# Patient Record
Sex: FEMALE | Race: WHITE | NOT HISPANIC OR LATINO
[De-identification: names, ages, dates, MRNs, and addresses within clinical notes are randomized per-mention and may not be internally consistent; named-entity substitution may affect disease eponyms.]

---

## 2019-03-13 PROBLEM — Z00.00 ENCOUNTER FOR PREVENTIVE HEALTH EXAMINATION: Status: ACTIVE | Noted: 2019-03-13

## 2019-03-14 ENCOUNTER — TRANSCRIPTION ENCOUNTER (OUTPATIENT)
Age: 39
End: 2019-03-14

## 2019-03-14 ENCOUNTER — APPOINTMENT (OUTPATIENT)
Dept: FAMILY MEDICINE | Facility: CLINIC | Age: 39
End: 2019-03-14
Payer: COMMERCIAL

## 2019-03-14 VITALS
DIASTOLIC BLOOD PRESSURE: 64 MMHG | HEART RATE: 68 BPM | OXYGEN SATURATION: 98 % | SYSTOLIC BLOOD PRESSURE: 116 MMHG | RESPIRATION RATE: 14 BRPM | BODY MASS INDEX: 23.78 KG/M2 | HEIGHT: 66 IN | WEIGHT: 148 LBS

## 2019-03-14 DIAGNOSIS — R41.3 OTHER AMNESIA: ICD-10-CM

## 2019-03-14 DIAGNOSIS — L65.9 NONSCARRING HAIR LOSS, UNSPECIFIED: ICD-10-CM

## 2019-03-14 DIAGNOSIS — Z82.49 FAMILY HISTORY OF ISCHEMIC HEART DISEASE AND OTHER DISEASES OF THE CIRCULATORY SYSTEM: ICD-10-CM

## 2019-03-14 DIAGNOSIS — Z83.3 FAMILY HISTORY OF DIABETES MELLITUS: ICD-10-CM

## 2019-03-14 DIAGNOSIS — Z78.9 OTHER SPECIFIED HEALTH STATUS: ICD-10-CM

## 2019-03-14 DIAGNOSIS — R53.83 OTHER FATIGUE: ICD-10-CM

## 2019-03-14 DIAGNOSIS — Z87.891 PERSONAL HISTORY OF NICOTINE DEPENDENCE: ICD-10-CM

## 2019-03-14 DIAGNOSIS — M25.50 PAIN IN UNSPECIFIED JOINT: ICD-10-CM

## 2019-03-14 DIAGNOSIS — Z12.31 ENCOUNTER FOR SCREENING MAMMOGRAM FOR MALIGNANT NEOPLASM OF BREAST: ICD-10-CM

## 2019-03-14 DIAGNOSIS — Z82.5 FAMILY HISTORY OF ASTHMA AND OTHER CHRONIC LOWER RESPIRATORY DISEASES: ICD-10-CM

## 2019-03-14 LAB — CYTOLOGY CVX/VAG DOC THIN PREP: NORMAL

## 2019-03-14 PROCEDURE — 36415 COLL VENOUS BLD VENIPUNCTURE: CPT

## 2019-03-14 PROCEDURE — 99213 OFFICE O/P EST LOW 20 MIN: CPT | Mod: 25

## 2019-03-14 NOTE — ASSESSMENT
[FreeTextEntry1] : Check labs drawn in office today for above assessed conditions. Labs to be resulted at the [facility] core lab. \par If nothing found on labs will consider rheumatology referral

## 2019-03-14 NOTE — HISTORY OF PRESENT ILLNESS
[FreeTextEntry8] : Pt c/o +fatigue +body aches +joint pain (hands) +tingling in feet and hands X 2-3 weeks. Patient also reports mild  brain fogginess, where she states she sometimes has difficulty recalling things. Recently started new fitness regimen one month ago. Denies weakness, speech difficulties, vision changes. Had MRI of brain last yr to evAluate migraine and MRI was negative. \par

## 2019-03-14 NOTE — PHYSICAL EXAM
[No Acute Distress] : no acute distress [Well Nourished] : well nourished [Well Developed] : well developed [Well-Appearing] : well-appearing [Normal Sclera/Conjunctiva] : normal sclera/conjunctiva [PERRL] : pupils equal round and reactive to light [Normal Outer Ear/Nose] : the outer ears and nose were normal in appearance [Supple] : supple [No Respiratory Distress] : no respiratory distress  [Clear to Auscultation] : lungs were clear to auscultation bilaterally [No Accessory Muscle Use] : no accessory muscle use [Normal Rate] : normal rate  [Regular Rhythm] : with a regular rhythm [Normal S1, S2] : normal S1 and S2 [No Murmur] : no murmur heard [Normal Gait] : normal gait [Coordination Grossly Intact] : coordination grossly intact [No Focal Deficits] : no focal deficits [Normal] : coordination was normal [Speech Grossly Normal] : speech grossly normal [Memory Grossly Normal] : memory grossly normal [Normal Affect] : the affect was normal [Alert and Oriented x3] : oriented to person, place, and time [Normal Mood] : the mood was normal [Normal Insight/Judgement] : insight and judgment were intact [de-identified] : C

## 2019-03-18 LAB
25(OH)D3 SERPL-MCNC: 35 NG/ML
ALBUMIN SERPL ELPH-MCNC: 5.1 G/DL
ALP BLD-CCNC: 62 U/L
ALT SERPL-CCNC: 22 U/L
ANA SER IF-ACNC: NEGATIVE
ANION GAP SERPL CALC-SCNC: 11 MMOL/L
AST SERPL-CCNC: 22 U/L
BASOPHILS # BLD AUTO: 0.05 K/UL
BASOPHILS NFR BLD AUTO: 0.5 %
BILIRUB SERPL-MCNC: 0.3 MG/DL
BUN SERPL-MCNC: 15 MG/DL
CALCIUM SERPL-MCNC: 10 MG/DL
CHLORIDE SERPL-SCNC: 104 MMOL/L
CO2 SERPL-SCNC: 24 MMOL/L
CREAT SERPL-MCNC: 0.73 MG/DL
CRP SERPL-MCNC: <0.1 MG/DL
EOSINOPHIL # BLD AUTO: 0.04 K/UL
EOSINOPHIL NFR BLD AUTO: 0.4 %
ERYTHROCYTE [SEDIMENTATION RATE] IN BLOOD BY WESTERGREN METHOD: 15 MM/HR
FERRITIN SERPL-MCNC: 32 NG/ML
GLUCOSE SERPL-MCNC: 98 MG/DL
HCT VFR BLD CALC: 40.1 %
HGB BLD-MCNC: 12.8 G/DL
IMM GRANULOCYTES NFR BLD AUTO: 0.2 %
IRON SATN MFR SERPL: 40 %
IRON SERPL-MCNC: 128 UG/DL
LYMPHOCYTES # BLD AUTO: 1.29 K/UL
LYMPHOCYTES NFR BLD AUTO: 13.5 %
MAN DIFF?: NORMAL
MCHC RBC-ENTMCNC: 30.8 PG
MCHC RBC-ENTMCNC: 31.9 GM/DL
MCV RBC AUTO: 96.6 FL
MONOCYTES # BLD AUTO: 0.63 K/UL
MONOCYTES NFR BLD AUTO: 6.6 %
NEUTROPHILS # BLD AUTO: 7.51 K/UL
NEUTROPHILS NFR BLD AUTO: 78.8 %
PLATELET # BLD AUTO: 265 K/UL
POTASSIUM SERPL-SCNC: 4.1 MMOL/L
PROT SERPL-MCNC: 7.2 G/DL
RBC # BLD: 4.15 M/UL
RBC # FLD: 12.7 %
RHEUMATOID FACT SER QL: <10 IU/ML
SODIUM SERPL-SCNC: 139 MMOL/L
T4 SERPL-MCNC: 6.3 UG/DL
TIBC SERPL-MCNC: 320 UG/DL
TSH SERPL-ACNC: 1.14 UIU/ML
UIBC SERPL-MCNC: 192 UG/DL
URATE SERPL-MCNC: 3.4 MG/DL
VIT B12 SERPL-MCNC: 608 PG/ML
WBC # FLD AUTO: 9.54 K/UL

## 2019-03-21 LAB

## 2019-04-04 ENCOUNTER — CLINICAL ADVICE (OUTPATIENT)
Age: 39
End: 2019-04-04

## 2020-06-24 ENCOUNTER — APPOINTMENT (OUTPATIENT)
Dept: FAMILY MEDICINE | Facility: CLINIC | Age: 40
End: 2020-06-24
Payer: COMMERCIAL

## 2020-06-24 ENCOUNTER — LABORATORY RESULT (OUTPATIENT)
Age: 40
End: 2020-06-24

## 2020-06-24 VITALS
TEMPERATURE: 99.1 F | RESPIRATION RATE: 16 BRPM | HEART RATE: 81 BPM | OXYGEN SATURATION: 99 % | WEIGHT: 153 LBS | DIASTOLIC BLOOD PRESSURE: 70 MMHG | BODY MASS INDEX: 24.59 KG/M2 | HEIGHT: 66 IN | SYSTOLIC BLOOD PRESSURE: 118 MMHG

## 2020-06-24 DIAGNOSIS — R20.2 PARESTHESIA OF SKIN: ICD-10-CM

## 2020-06-24 DIAGNOSIS — M25.532 PAIN IN LEFT WRIST: ICD-10-CM

## 2020-06-24 PROCEDURE — 36415 COLL VENOUS BLD VENIPUNCTURE: CPT

## 2020-06-24 PROCEDURE — 99213 OFFICE O/P EST LOW 20 MIN: CPT | Mod: 25

## 2020-06-24 RX ORDER — SPIRONOLACTONE 50 MG/1
50 TABLET ORAL TWICE DAILY
Qty: 60 | Refills: 0 | Status: COMPLETED | COMMUNITY
Start: 2019-03-14 | End: 2020-06-24

## 2020-06-24 NOTE — REVIEW OF SYSTEMS
[Negative] : Respiratory [FreeTextEntry9] : +tingling in arms and legs intermittently , +left wrist discomfort  at times

## 2020-06-24 NOTE — PLAN
[FreeTextEntry1] : \par \par intermittent tingling/ carpal tunnel ? r/o MS due to paresthesia in arms and legs intermittently b/l  vs nerve impingement \par \par naproxen  trial for possible carpal tunnel bid x 5 days prn with food\par flexeril 5mg -10mg po qhs prn no driving after use\par night splint for wrists \par b12/folate\par tsh \par cmp\par cbc\par KRIS\par esr/crp\par \par MRI brain/ Cspine/thoracic/ lumbar spine \par neuro consult for emg studies as well \par \par Enlarged neck, hx thyroid nodule \par -US neck\par -Tsh with reflex free t4\par \par zp patchogue\par \par f/u 2 weeks

## 2020-06-24 NOTE — HISTORY OF PRESENT ILLNESS
[FreeTextEntry8] : Patient presents for intermittent muscle spasms and numbness in lower extremities. \par \par she said she has a hx of left foot arthritis and burning sensation for 5 mos intermittent \par she has a rheumatologist appointment\par she said she was having tingling in left foot also and radiating up the leg this week \par she said now she is starting to feel tingling in right foot now this week\par \par the tingling comes and goes\par she said saturday night she had muscle spasms all over her body and still happening but not as often and not as noticeble or as frequent\par \par she said she gets tingling in left hand intermittently also\par \par \par denies pain but has discomfort in these areas\par \par denies fevers or chills, sob , chest pain\par \par

## 2020-06-24 NOTE — PHYSICAL EXAM
[No Lymphadenopathy] : no lymphadenopathy [Supple] : supple [Normal] : normal rate, regular rhythm, normal S1 and S2 and no murmur heard [No Edema] : there was no peripheral edema [No Focal Deficits] : no focal deficits [Normal Affect] : the affect was normal [Normal Mood] : the mood was normal [de-identified] : thyroid feels enlarged [de-identified] : CN 2-12 INTACT, NORMAL STRENGTH UPPER AND LOWER EXT B/L 5/5, NORMAL RAPID ALTERNATING MOVEMENTS AND FINGER TO NOSE, normal neurofilament testing b/l arms and legs  [Normal Insight/Judgement] : insight and judgment were intact

## 2020-06-26 LAB
ALBUMIN SERPL ELPH-MCNC: 4.3 G/DL
ALP BLD-CCNC: 51 U/L
ALT SERPL-CCNC: 15 U/L
ANA SER IF-ACNC: NEGATIVE
ANION GAP SERPL CALC-SCNC: 9 MMOL/L
AST SERPL-CCNC: 23 U/L
BASOPHILS # BLD AUTO: 0.04 K/UL
BASOPHILS NFR BLD AUTO: 0.6 %
BILIRUB SERPL-MCNC: 0.4 MG/DL
BUN SERPL-MCNC: 15 MG/DL
CALCIUM SERPL-MCNC: 7.6 MG/DL
CHLORIDE SERPL-SCNC: 105 MMOL/L
CO2 SERPL-SCNC: 24 MMOL/L
CREAT SERPL-MCNC: 0.62 MG/DL
CRP SERPL-MCNC: 0.1 MG/DL
EOSINOPHIL # BLD AUTO: 0.04 K/UL
EOSINOPHIL NFR BLD AUTO: 0.6 %
ERYTHROCYTE [SEDIMENTATION RATE] IN BLOOD BY WESTERGREN METHOD: 5 MM/HR
ESTIMATED AVERAGE GLUCOSE: 91 MG/DL
FOLATE SERPL-MCNC: 17.5 NG/ML
GLUCOSE SERPL-MCNC: 102 MG/DL
HBA1C MFR BLD HPLC: 4.8 %
HCT VFR BLD CALC: 38.6 %
HGB BLD-MCNC: 12.2 G/DL
IMM GRANULOCYTES NFR BLD AUTO: 0.3 %
LYMPHOCYTES # BLD AUTO: 0.73 K/UL
LYMPHOCYTES NFR BLD AUTO: 11.7 %
MAN DIFF?: NORMAL
MCHC RBC-ENTMCNC: 30.5 PG
MCHC RBC-ENTMCNC: 31.6 GM/DL
MCV RBC AUTO: 96.5 FL
MONOCYTES # BLD AUTO: 0.46 K/UL
MONOCYTES NFR BLD AUTO: 7.3 %
NEUTROPHILS # BLD AUTO: 4.97 K/UL
NEUTROPHILS NFR BLD AUTO: 79.5 %
PLATELET # BLD AUTO: 262 K/UL
POTASSIUM SERPL-SCNC: 4.4 MMOL/L
PROT SERPL-MCNC: 6.8 G/DL
RBC # BLD: 4 M/UL
RBC # FLD: 12.6 %
SODIUM SERPL-SCNC: 139 MMOL/L
TSH SERPL-ACNC: 0.98 UIU/ML
VIT B12 SERPL-MCNC: 1165 PG/ML
WBC # FLD AUTO: 6.26 K/UL

## 2020-06-27 ENCOUNTER — NON-APPOINTMENT (OUTPATIENT)
Age: 40
End: 2020-06-27

## 2020-07-15 DIAGNOSIS — R59.1 GENERALIZED ENLARGED LYMPH NODES: ICD-10-CM

## 2020-07-16 ENCOUNTER — LABORATORY RESULT (OUTPATIENT)
Age: 40
End: 2020-07-16

## 2020-07-17 ENCOUNTER — APPOINTMENT (OUTPATIENT)
Dept: ENDOCRINOLOGY | Facility: CLINIC | Age: 40
End: 2020-07-17
Payer: COMMERCIAL

## 2020-07-17 VITALS
SYSTOLIC BLOOD PRESSURE: 120 MMHG | HEART RATE: 85 BPM | BODY MASS INDEX: 24.75 KG/M2 | OXYGEN SATURATION: 98 % | WEIGHT: 154 LBS | DIASTOLIC BLOOD PRESSURE: 80 MMHG | HEIGHT: 66 IN

## 2020-07-17 DIAGNOSIS — Z80.8 FAMILY HISTORY OF MALIGNANT NEOPLASM OF OTHER ORGANS OR SYSTEMS: ICD-10-CM

## 2020-07-17 PROCEDURE — 99244 OFF/OP CNSLTJ NEW/EST MOD 40: CPT

## 2020-07-17 RX ORDER — NAPROXEN 500 MG/1
500 TABLET ORAL
Qty: 10 | Refills: 0 | Status: DISCONTINUED | COMMUNITY
Start: 2020-06-24 | End: 2020-07-17

## 2020-07-17 RX ORDER — CYCLOBENZAPRINE HYDROCHLORIDE 5 MG/1
5 TABLET, FILM COATED ORAL
Qty: 14 | Refills: 0 | Status: DISCONTINUED | COMMUNITY
Start: 2020-06-24 | End: 2020-07-17

## 2020-07-17 NOTE — DATA REVIEWED
[FreeTextEntry1] : Thyroid sonogram 7/14/20\par thyroid enlarged and heterogeneous\par RLP nodule [id number] mm - solid, increased\par RLP nodule 7x6x6 mm - calcified, stable\par RMP nodule 8x6x4 mm - hypoechoic, slightly increased\par RMP nodule 6x8x5 mm - hypoechoic, stable\par RLP nodule complex cystic 68e22w2 mm - new\par LUP nodule hypoechoic 9x6x4 mm - new\par LMP nodule 12x9x5 mm - complex, cystic - new\par LMP nodule 7x7x2 mm cystic nodule, new\par

## 2020-07-17 NOTE — PHYSICAL EXAM
[Alert] : alert [No Acute Distress] : no acute distress [Well Nourished] : well nourished [No LAD] : no lymphadenopathy [EOMI] : extra ocular movement intact [No Accessory Muscle Use] : no accessory muscle use [No Respiratory Distress] : no respiratory distress [Clear to Auscultation] : lungs were clear to auscultation bilaterally [Normal S1, S2] : normal S1 and S2 [Normal Rate] : heart rate was normal [Cranial Nerves Intact] : cranial nerves 2-12 were intact [Normal Reflexes] : deep tendon reflexes were 2+ and symmetric [Oriented x3] : oriented to person, place, and time [No Tremors] : no tremors [Normal Insight/Judgement] : insight and judgment were intact [Normal Affect] : the affect was normal [Normal Mood] : the mood was normal [de-identified] : Right thyroid nodule - firm, nontender

## 2020-07-17 NOTE — CONSULT LETTER
[Dear  ___] : Dear  [unfilled], [Consult Letter:] : I had the pleasure of evaluating your patient, [unfilled]. [Please see my note below.] : Please see my note below. [Consult Closing:] : Thank you very much for allowing me to participate in the care of this patient.  If you have any questions, please do not hesitate to contact me. [Sincerely,] : Sincerely, [FreeTextEntry3] : Sindhu Crooks, \par

## 2020-07-17 NOTE — ASSESSMENT
[FreeTextEntry1] : 40 year old female with bilateral thyroid nodules - some new, some stable and some increased in size. Images reviewed on [facility] portal.  - heterogeneous, enlarged gland with bilateral nodules suggests Hashimoto's. TSH normal and she is euthyroid\par - check TPO and Tg ab today\par - we discussed risks of thyroid nodules\par - rec FNA biopsy of 3 nodules measuring over 1 cm: RLP nodule 19 mm, RLP nodule 15 mm and LMP nodule 12 mm\par - reviewed thyroid FNA proceduure\par - hold fishoil and vitamin E 1 week prior to biopsy

## 2020-07-17 NOTE — HISTORY OF PRESENT ILLNESS
[FreeTextEntry1] : Quality: bilateral thyroid nodules\par Duration: 2009\par Onset: found on physical exam\par Severity: moderate\par Associated symptoms: denies anterior neck pain, dysphagia or voice changes. denies changes in neck size. \par \par MODIFYING FACTORS: has never needed FNA biopsy.  maternal aunt (+) thyroid cancer, paternal cousin (+) thyroid cancer. no radiation exposure to the neck.\par Medication history: no thyroid meds, no herbal. OTC: brain and liver supplement, vit D, C, B12, cod liver oil, zinc\par \par prior endo: Dr Penn, does not recall visits\par \par \par \par

## 2020-07-17 NOTE — REASON FOR VISIT
[Consultation] : a consultation visit [Thyroid nodule/ MNG] : thyroid nodule/ MNG [FreeTextEntry2] : Dr. Sahu

## 2020-07-21 ENCOUNTER — APPOINTMENT (OUTPATIENT)
Dept: FAMILY MEDICINE | Facility: CLINIC | Age: 40
End: 2020-07-21

## 2021-01-12 ENCOUNTER — APPOINTMENT (OUTPATIENT)
Dept: ENDOCRINOLOGY | Facility: CLINIC | Age: 41
End: 2021-01-12

## 2021-01-19 ENCOUNTER — APPOINTMENT (OUTPATIENT)
Dept: ENDOCRINOLOGY | Facility: CLINIC | Age: 41
End: 2021-01-19
Payer: COMMERCIAL

## 2021-01-19 VITALS
WEIGHT: 150 LBS | HEART RATE: 91 BPM | SYSTOLIC BLOOD PRESSURE: 124 MMHG | OXYGEN SATURATION: 98 % | BODY MASS INDEX: 24.11 KG/M2 | DIASTOLIC BLOOD PRESSURE: 80 MMHG | HEIGHT: 66 IN

## 2021-01-19 PROCEDURE — 99213 OFFICE O/P EST LOW 20 MIN: CPT

## 2021-01-19 PROCEDURE — 99072 ADDL SUPL MATRL&STAF TM PHE: CPT

## 2021-01-19 NOTE — PHYSICAL EXAM
[Alert] : alert [Well Nourished] : well nourished [No Acute Distress] : no acute distress [EOMI] : extra ocular movement intact [No LAD] : no lymphadenopathy [No Respiratory Distress] : no respiratory distress [Clear to Auscultation] : lungs were clear to auscultation bilaterally [Normal S1, S2] : normal S1 and S2 [Normal Rate] : heart rate was normal [No Tremors] : no tremors [Oriented x3] : oriented to person, place, and time [Normal Affect] : the affect was normal [Normal Insight/Judgement] : insight and judgment were intact [Normal Mood] : the mood was normal [de-identified] : Right thyroid nodule - firm, nontender

## 2021-01-19 NOTE — ASSESSMENT
[FreeTextEntry1] : 40 year old female with bilateral thyroid nodules on sono 7/2020- some new, some stable and some increased in size. s/p Thyroid FNA biopsy of 3 nodules 7/2020 - all w benign path.  She is clinically and biochemically euthyroid\par - con t to monitor nodules with sonogram, consider FNA biopsy if nodules increase size/change in appearance\par - repeat sono 1/2021 and 6/2021\par - repeat TFTs this month\par - hold fishoil and vitamin E 1 week prior to biopsy

## 2021-01-19 NOTE — DATA REVIEWED
[FreeTextEntry1] : Thyroid sonogram 7/14/20\par thyroid enlarged and heterogeneous\par RLP nodule 73u54h39 mm - solid, increased\par RLP nodule 7x6x6 mm - calcified, stable\par RMP nodule 8x6x4 mm - hypoechoic, slightly increased\par RMP nodule 6x8x5 mm - hypoechoic, stable\par RLP nodule complex cystic [id number] mm - new\par LUP nodule hypoechoic 9x6x4 mm - new\par LMP nodule 12x9x5 mm - complex, cystic - new\par LMP nodule 7x7x2 mm cystic nodule, new\par \par Thyroid nodule FNA 7/29/20 reviewed\par 1. RLP thyroid nodyle 1.4 cm - benign, cat 2\par 2. RLP thyroid nodule 1.5 cm - benign, cat 2\par 3. LMP thyroid nodule 1.2 cm - benign, cat 2\par

## 2021-01-19 NOTE — HISTORY OF PRESENT ILLNESS
[FreeTextEntry1] : Quality: bilateral thyroid nodules\par Duration: 2009\par Onset: found on physical exam\par Severity: moderate\par Associated symptoms: denies anterior neck pain, dysphagia or voice changes. denies changes in neck size. \par \par MODIFYING FACTORS:  maternal aunt (+) thyroid cancer, paternal cousin (+) thyroid cancer. no radiation exposure to the neck.\par Medication history: no thyroid meds, OTC: brain and liver supplement, vit D, C, B12, cod liver oil, zinc\par \par prior endo: Dr Penn, does not recall visits\par \par \par \par

## 2021-02-23 ENCOUNTER — TRANSCRIPTION ENCOUNTER (OUTPATIENT)
Age: 41
End: 2021-02-23

## 2021-07-13 ENCOUNTER — APPOINTMENT (OUTPATIENT)
Dept: ENDOCRINOLOGY | Facility: CLINIC | Age: 41
End: 2021-07-13
Payer: COMMERCIAL

## 2021-07-13 VITALS
WEIGHT: 150 LBS | HEIGHT: 66 IN | SYSTOLIC BLOOD PRESSURE: 130 MMHG | DIASTOLIC BLOOD PRESSURE: 80 MMHG | OXYGEN SATURATION: 99 % | HEART RATE: 54 BPM | BODY MASS INDEX: 24.11 KG/M2

## 2021-07-13 PROCEDURE — 99072 ADDL SUPL MATRL&STAF TM PHE: CPT

## 2021-07-13 PROCEDURE — 99213 OFFICE O/P EST LOW 20 MIN: CPT

## 2021-07-13 NOTE — PHYSICAL EXAM
[Alert] : alert [Well Nourished] : well nourished [No Acute Distress] : no acute distress [EOMI] : extra ocular movement intact [No LAD] : no lymphadenopathy [Clear to Auscultation] : lungs were clear to auscultation bilaterally [Normal S1, S2] : normal S1 and S2 [Normal Rate] : heart rate was normal [No Tremors] : no tremors [Oriented x3] : oriented to person, place, and time [Normal Affect] : the affect was normal [Normal Insight/Judgement] : insight and judgment were intact [Normal Mood] : the mood was normal [No Accessory Muscle Use] : no accessory muscle use [de-identified] : [place] thyroid nodules - nontender

## 2021-07-13 NOTE — ASSESSMENT
[FreeTextEntry1] : 41 year old female with bilateral thyroid nodules on sono 7/2020  s/p Thyroid FNA biopsy of 3 nodules 7/2020 - all w benign path.  She is clinically and biochemically euthyroid. Nodules do not cause symptoms and nodules stable on recent sonograms\par - con t to monitor nodules with sonogram, consider FNA biopsy if nodules increase size/change in appearance\par - repeat sono summer 2022\par - repeat TFTs 2022

## 2021-07-13 NOTE — DATA REVIEWED
[FreeTextEntry1] : Thyroid sonogram 7/14/20\par thyroid enlarged and heterogeneous\par RLP nodule 67f50v29 mm - solid, increased\par RLP nodule 7x6x6 mm - calcified, stable\par RMP nodule 8x6x4 mm - hypoechoic, slightly increased\par RMP nodule 6x8x5 mm - hypoechoic, stable\par RLP nodule complex cystic 83g42q5 mm - new\par LUP nodule hypoechoic 9x6x4 mm - new\par LMP nodule 12x9x5 mm - complex, cystic - new\par LMP nodule 7x7x2 mm cystic nodule, new\par \par Thyroid nodule FNA 7/29/20 reviewed\par 1. RLP thyroid nodyle 1.4 cm - benign, cat 2\par 2. RLP thyroid nodule 1.5 cm - benign, cat 2\par 3. LMP thyroid nodule 1.2 cm - benign, cat 2\par \par Thyroid sono 2/17/21\par Right lobe 7.2x2.5x2.0 cm w multiple nodules \par RUP nodule 8x5x4 mm - stable\par RMP nodule 7x6x5 mm - complex, stable\par RMP nodule 7x6x4 mm -stable\par RLP nodule 6x4x6 mm - calcified, stable\par RLP nodule 72t14x69 mm - stable, benign FNA in 2002\par prior Right nodule 76k69b3 mm - not seen, benign FNA in 2020\par Left lobe 5.6x1.8x1.4 cm\par LUP nodule 7x6x4 mm - complex, stable\par LMP nodule 7x5x3 mm - complex, cystic, decreased, benign FNA in 2020\par \par Thyroid sono 7/7/21\par Right lobe 7.3x2.0x2.1 cm w multiple nodules \par RUP nodule 7x6x5 mm - stable\par RMP nodule 7x7x5 mm - complex, stable\par RLP nodule 6x6x5 mm - calcified, stable\par RLP nodule 96p64p60 mm - stable, benign FNA in 2002\par prior Right nodule 35w01r8 mm - not seen, benign FNA in 2020\par Left lobe 5.2x1.8x1.5 cm\par LUP nodule 5x5x4 mm -  stable\par LMP nodule 9x8x4 mm  - complex, stable\par LMP nodule 8x5x3 mm - complex, cystic, decreased, benign FNA in 2020\par \par Labs 2/2021 TSH 0.741, Ft4 1.37\par

## 2022-07-12 ENCOUNTER — APPOINTMENT (OUTPATIENT)
Dept: ENDOCRINOLOGY | Facility: CLINIC | Age: 42
End: 2022-07-12

## 2022-07-26 ENCOUNTER — APPOINTMENT (OUTPATIENT)
Dept: ENDOCRINOLOGY | Facility: CLINIC | Age: 42
End: 2022-07-26

## 2022-07-26 PROCEDURE — 99213 OFFICE O/P EST LOW 20 MIN: CPT

## 2022-07-26 NOTE — PHYSICAL EXAM
[Alert] : alert [No Acute Distress] : no acute distress [EOMI] : extra ocular movement intact [Oriented x3] : oriented to person, place, and time [Normal Affect] : the affect was normal [Normal Insight/Judgement] : insight and judgment were intact [Normal Mood] : the mood was normal

## 2022-07-26 NOTE — HISTORY OF PRESENT ILLNESS
[FreeTextEntry1] : Interval Hx - no issues, no changes\par \par Quality: bilateral thyroid nodules\par Duration: 2009\par Onset: found on physical exam\par Severity: moderate\par Associated symptoms: denies anterior neck pain, dysphagia or voice changes. denies changes in neck size. \par \par MODIFYING FACTORS:  maternal aunt (+) thyroid cancer, paternal cousin (+) thyroid cancer. no radiation exposure to the neck.\par Medication history: no thyroid meds\par \par \par \par \par \par

## 2022-07-26 NOTE — DATA REVIEWED
[FreeTextEntry1] : Thyroid sonogram 7/14/20\par thyroid enlarged and heterogeneous\par RLP nodule 63b77k57 mm - solid, increased\par RLP nodule 7x6x6 mm - calcified, stable\par RMP nodule 8x6x4 mm - hypoechoic, slightly increased\par RMP nodule 6x8x5 mm - hypoechoic, stable\par RLP nodule complex cystic 08n67d1 mm - new\par LUP nodule hypoechoic 9x6x4 mm - new\par LMP nodule 12x9x5 mm - complex, cystic - new\par LMP nodule 7x7x2 mm cystic nodule, new\par \par Thyroid nodule FNA 7/29/20 reviewed\par 1. RLP thyroid nodyle 1.4 cm - benign, cat 2\par 2. RLP thyroid nodule 1.5 cm - benign, cat 2\par 3. LMP thyroid nodule 1.2 cm - benign, cat 2\par \par Thyroid sono 2/17/21\par Right lobe 7.2x2.5x2.0 cm w multiple nodules \par RUP nodule 8x5x4 mm - stable\par RMP nodule 7x6x5 mm - complex, stable\par RMP nodule 7x6x4 mm -stable\par RLP nodule 6x4x6 mm - calcified, stable\par RLP nodule 84x41p61 mm - stable, benign FNA in 2002\par prior Right nodule 61u58g7 mm - not seen, benign FNA in 2020\par Left lobe 5.6x1.8x1.4 cm\par LUP nodule 7x6x4 mm - complex, stable\par LMP nodule 7x5x3 mm - complex, cystic, decreased, benign FNA in 2020\par \par Thyroid sono 7/7/21\par Right lobe 7.3x2.0x2.1 cm w multiple nodules \par RUP nodule 7x6x5 mm - stable\par RMP nodule 7x7x5 mm - complex, stable\par RLP nodule 6x6x5 mm - calcified, stable\par RLP nodule 97e38a00 mm - stable, benign FNA in 2002\par prior Right nodule 53w72m9 mm - not seen, benign FNA in 2020\par Left lobe 5.2x1.8x1.5 cm\par LUP nodule 5x5x4 mm -  stable\par LMP nodule 9x8x4 mm  - complex, stable\par LMP nodule 8x5x3 mm - complex, cystic, decreased, benign FNA in 2020\par \par Labs 2/2021 TSH 0.741, Ft4 1.37\par

## 2022-07-26 NOTE — ASSESSMENT
[FreeTextEntry1] : 42 year old female with bilateral thyroid nodules on sono 7/2020  s/p Thyroid FNA biopsy of 3 nodules 7/2020 - all w benign path.  She is clinically euthyroid. Nodules do not cause symptoms.\par - cont to monitor nodules with sonogram, consider FNA biopsy if nodules increase size/change in appearance; will mail FNA Rx to pt\par - check TSH\par - can f/u 1 year if okay

## 2022-08-24 ENCOUNTER — TRANSCRIPTION ENCOUNTER (OUTPATIENT)
Age: 42
End: 2022-08-24

## 2022-08-26 ENCOUNTER — NON-APPOINTMENT (OUTPATIENT)
Age: 42
End: 2022-08-26

## 2023-07-24 LAB — TSH SERPL-ACNC: 0.83

## 2023-07-25 ENCOUNTER — APPOINTMENT (OUTPATIENT)
Dept: ENDOCRINOLOGY | Facility: CLINIC | Age: 43
End: 2023-07-25
Payer: COMMERCIAL

## 2023-07-25 VITALS
DIASTOLIC BLOOD PRESSURE: 70 MMHG | HEIGHT: 66 IN | OXYGEN SATURATION: 98 % | BODY MASS INDEX: 24.59 KG/M2 | WEIGHT: 153 LBS | HEART RATE: 81 BPM | SYSTOLIC BLOOD PRESSURE: 110 MMHG

## 2023-07-25 DIAGNOSIS — E04.2 NONTOXIC MULTINODULAR GOITER: ICD-10-CM

## 2023-07-25 DIAGNOSIS — R07.0 PAIN IN THROAT: ICD-10-CM

## 2023-07-25 DIAGNOSIS — K21.9 GASTRO-ESOPHAGEAL REFLUX DISEASE W/OUT ESOPHAGITIS: ICD-10-CM

## 2023-07-25 PROCEDURE — 99214 OFFICE O/P EST MOD 30 MIN: CPT

## 2023-07-25 RX ORDER — MULTIVIT-MIN/FOLIC/VIT K/LYCOP 400-300MCG
1000 TABLET ORAL
Refills: 0 | Status: DISCONTINUED | COMMUNITY
Start: 2020-06-24 | End: 2023-07-25

## 2023-07-25 RX ORDER — GABAPENTIN 100 MG
100 TABLET ORAL AT BEDTIME
Refills: 0 | Status: ACTIVE | COMMUNITY

## 2023-07-25 RX ORDER — UBIDECARENONE/VIT E ACET 100MG-5
25 MCG CAPSULE ORAL
Qty: 30 | Refills: 0 | Status: DISCONTINUED | COMMUNITY
Start: 2020-06-24 | End: 2023-07-25

## 2023-07-25 RX ORDER — ZINC GLUCONATE 100 MG
100 TABLET ORAL
Qty: 15 | Refills: 0 | Status: DISCONTINUED | COMMUNITY
Start: 2020-06-24 | End: 2023-07-25

## 2023-07-25 RX ORDER — PSYLLIUM HUSK 0.4 G
1000-800 CAPSULE ORAL
Qty: 60 | Refills: 0 | Status: DISCONTINUED | COMMUNITY
Start: 2020-06-24 | End: 2023-07-25

## 2023-07-25 RX ORDER — B-COMPLEX WITH VITAMIN C
TABLET ORAL DAILY
Refills: 0 | Status: DISCONTINUED | COMMUNITY
Start: 2020-06-24 | End: 2023-07-25

## 2023-07-25 NOTE — PHYSICAL EXAM
[Alert] : alert [No Acute Distress] : no acute distress [EOMI] : extra ocular movement intact [Oriented x3] : oriented to person, place, and time [Normal Affect] : the affect was normal [Normal Insight/Judgement] : insight and judgment were intact [Normal Mood] : the mood was normal [No LAD] : no lymphadenopathy [Normal S1, S2] : normal S1 and S2 [Normal Rate] : heart rate was normal [Normal Reflexes] : deep tendon reflexes were 2+ and symmetric [No Tremors] : no tremors [de-identified] : Bilateral thyroid nodules Right > Left, firm, nontender

## 2023-07-25 NOTE — HISTORY OF PRESENT ILLNESS
[FreeTextEntry1] : Interval Hx - no issues, no changes\par \par Quality: bilateral thyroid nodules\par Duration: 2009\par Onset: found on physical exam\par Severity: moderate\par Associated symptoms: denies anterior neck pain, dysphagia or voice changes. denies changes in neck size. "somethine stuck" sensation in throat - when this happened before she was told she had reflux, worse after meals\par \par MODIFYING FACTORS:  maternal aunt (+) thyroid cancer, paternal cousin (+) thyroid cancer. no radiation exposure to the neck.\par Medication history: no thyroid meds\par \par \par \par \par \par

## 2023-07-25 NOTE — DATA REVIEWED
[FreeTextEntry1] : Thyroid sonogram 7/14/20\par thyroid enlarged and heterogeneous\par RLP nodule 47r76g74 mm - solid, increased\par RLP nodule 7x6x6 mm - calcified, stable\par RMP nodule 8x6x4 mm - hypoechoic, slightly increased\par RMP nodule 6x8x5 mm - hypoechoic, stable\par RLP nodule complex cystic 81f59l2 mm - new\par LUP nodule hypoechoic 9x6x4 mm - new\par LMP nodule 12x9x5 mm - complex, cystic - new\par LMP nodule 7x7x2 mm cystic nodule, new\par \par Thyroid nodule FNA 7/29/20 reviewed\par 1. RLP thyroid nodyle 1.4 cm - benign, cat 2\par 2. RLP thyroid nodule 1.5 cm - benign, cat 2\par 3. LMP thyroid nodule 1.2 cm - benign, cat 2\par \par Thyroid sono 2/17/21\par Right lobe 7.2x2.5x2.0 cm w multiple nodules \par RUP nodule 8x5x4 mm - stable\par RMP nodule 7x6x5 mm - complex, stable\par RMP nodule 7x6x4 mm -stable\par RLP nodule 6x4x6 mm - calcified, stable\par RLP nodule 97p07e99 mm - stable, benign FNA in 2002\par prior Right nodule 85z13g3 mm - not seen, benign FNA in 2020\par Left lobe 5.6x1.8x1.4 cm\par LUP nodule 7x6x4 mm - complex, stable\par LMP nodule 7x5x3 mm - complex, cystic, decreased, benign FNA in 2020\par \par Thyroid sono 7/7/21\par Right lobe 7.3x2.0x2.1 cm w multiple nodules \par RUP nodule 7x6x5 mm - stable\par RMP nodule 7x7x5 mm - complex, stable\par RLP nodule 6x6x5 mm - calcified, stable\par RLP nodule 30u38e12 mm - stable, benign FNA in 2002\par prior Right nodule 91m78l5 mm - not seen, benign FNA in 2020\par Left lobe 5.2x1.8x1.5 cm\par LUP nodule 5x5x4 mm -  stable\par LMP nodule 9x8x4 mm  - complex, stable\par LMP nodule 8x5x3 mm - complex, cystic, decreased, benign FNA in 2020\par \par

## 2023-08-23 ENCOUNTER — NON-APPOINTMENT (OUTPATIENT)
Age: 43
End: 2023-08-23

## 2023-09-06 ENCOUNTER — APPOINTMENT (OUTPATIENT)
Dept: ORTHOPEDIC SURGERY | Facility: CLINIC | Age: 43
End: 2023-09-06
Payer: COMMERCIAL

## 2023-09-06 VITALS — HEIGHT: 66 IN | WEIGHT: 153 LBS | BODY MASS INDEX: 24.59 KG/M2

## 2023-09-06 DIAGNOSIS — M50.20 OTHER CERVICAL DISC DISPLACEMENT, UNSPECIFIED CERVICAL REGION: ICD-10-CM

## 2023-09-06 DIAGNOSIS — Z78.9 OTHER SPECIFIED HEALTH STATUS: ICD-10-CM

## 2023-09-06 DIAGNOSIS — M62.838 OTHER MUSCLE SPASM: ICD-10-CM

## 2023-09-06 DIAGNOSIS — M48.9 SPONDYLOPATHY, UNSPECIFIED: ICD-10-CM

## 2023-09-06 PROCEDURE — 99204 OFFICE O/P NEW MOD 45 MIN: CPT

## 2023-09-06 RX ORDER — METHYLPREDNISOLONE 4 MG/1
4 TABLET ORAL
Qty: 1 | Refills: 0 | Status: ACTIVE | COMMUNITY
Start: 2023-09-06 | End: 1900-01-01

## 2023-09-06 NOTE — DATA REVIEWED
[MRI] : MRI [Cervical Spine] : cervical spine [Report was reviewed and noted in the chart] : The report was reviewed and noted in the chart [I reviewed the films/CD and agree] : I reviewed the films/CD and agree

## 2023-09-06 NOTE — ASSESSMENT
[FreeTextEntry1] : neck pain and pain radiating down right arm.  mri c spine by outside doc shows significant hnp c56 and c67.  discussed with patientn

## 2023-09-06 NOTE — HISTORY OF PRESENT ILLNESS
[5] : 5 [7] : 7 [Tightness] : tightness [Tingling] : tingling [Heat] : heat [de-identified] : 9/6/23:  neck and right arm pain and weakness. [] : no [FreeTextEntry1] : R shoulder  [FreeTextEntry3] : 04/2023  [FreeTextEntry5] : pt states no injury has occurred, has history of pinched nerve.  [FreeTextEntry7] : R hand  [FreeTextEntry9] : PT  [de-identified] : ortho  [de-identified] : nabila  [de-identified] : marie

## 2023-11-08 ENCOUNTER — APPOINTMENT (OUTPATIENT)
Dept: ORTHOPEDIC SURGERY | Facility: CLINIC | Age: 43
End: 2023-11-08

## 2024-07-18 LAB — TSH SERPL-ACNC: 1.09

## 2024-07-19 ENCOUNTER — APPOINTMENT (OUTPATIENT)
Dept: ENDOCRINOLOGY | Facility: CLINIC | Age: 44
End: 2024-07-19
Payer: COMMERCIAL

## 2024-07-19 VITALS
SYSTOLIC BLOOD PRESSURE: 112 MMHG | HEIGHT: 66 IN | OXYGEN SATURATION: 98 % | HEART RATE: 75 BPM | WEIGHT: 156 LBS | DIASTOLIC BLOOD PRESSURE: 82 MMHG | BODY MASS INDEX: 25.07 KG/M2

## 2024-07-19 DIAGNOSIS — E04.2 NONTOXIC MULTINODULAR GOITER: ICD-10-CM

## 2024-07-19 PROCEDURE — 99213 OFFICE O/P EST LOW 20 MIN: CPT

## 2024-07-24 ENCOUNTER — NON-APPOINTMENT (OUTPATIENT)
Age: 44
End: 2024-07-24

## 2025-07-17 LAB — TSH SERPL-ACNC: 1.09

## 2025-07-18 ENCOUNTER — APPOINTMENT (OUTPATIENT)
Dept: ENDOCRINOLOGY | Facility: CLINIC | Age: 45
End: 2025-07-18
Payer: COMMERCIAL

## 2025-07-18 ENCOUNTER — NON-APPOINTMENT (OUTPATIENT)
Age: 45
End: 2025-07-18

## 2025-07-18 VITALS
DIASTOLIC BLOOD PRESSURE: 76 MMHG | OXYGEN SATURATION: 99 % | BODY MASS INDEX: 24.11 KG/M2 | HEIGHT: 66 IN | WEIGHT: 150 LBS | SYSTOLIC BLOOD PRESSURE: 118 MMHG | HEART RATE: 69 BPM

## 2025-07-18 PROCEDURE — 99213 OFFICE O/P EST LOW 20 MIN: CPT

## 2025-07-18 RX ORDER — PROGESTERONE 100 MG/1
100 CAPSULE ORAL
Refills: 0 | Status: ACTIVE | COMMUNITY

## 2025-07-18 RX ORDER — ESTRADIOL 0.03 MG/D
0.03 PATCH, EXTENDED RELEASE TRANSDERMAL
Refills: 0 | Status: ACTIVE | COMMUNITY